# Patient Record
Sex: FEMALE | Race: ASIAN | Employment: FULL TIME | URBAN - METROPOLITAN AREA
[De-identification: names, ages, dates, MRNs, and addresses within clinical notes are randomized per-mention and may not be internally consistent; named-entity substitution may affect disease eponyms.]

---

## 2017-05-30 ENCOUNTER — TRANSFERRED RECORDS (OUTPATIENT)
Dept: HEALTH INFORMATION MANAGEMENT | Facility: CLINIC | Age: 47
End: 2017-05-30

## 2017-05-30 LAB
HPV ABSTRACT: NORMAL
PAP-ABSTRACT: NORMAL

## 2017-11-24 ENCOUNTER — APPOINTMENT (OUTPATIENT)
Dept: GENERAL RADIOLOGY | Facility: CLINIC | Age: 47
End: 2017-11-24
Attending: EMERGENCY MEDICINE
Payer: COMMERCIAL

## 2017-11-24 ENCOUNTER — HOSPITAL ENCOUNTER (EMERGENCY)
Facility: CLINIC | Age: 47
Discharge: HOME | End: 2017-11-24
Attending: EMERGENCY MEDICINE | Admitting: EMERGENCY MEDICINE
Payer: COMMERCIAL

## 2017-11-24 ENCOUNTER — APPOINTMENT (OUTPATIENT)
Dept: CT IMAGING | Facility: CLINIC | Age: 47
End: 2017-11-24
Attending: EMERGENCY MEDICINE
Payer: COMMERCIAL

## 2017-11-24 VITALS
RESPIRATION RATE: 18 BRPM | SYSTOLIC BLOOD PRESSURE: 137 MMHG | OXYGEN SATURATION: 100 % | BODY MASS INDEX: 20.58 KG/M2 | HEIGHT: 61 IN | TEMPERATURE: 97 F | DIASTOLIC BLOOD PRESSURE: 93 MMHG | WEIGHT: 109 LBS

## 2017-11-24 DIAGNOSIS — R05.9 COUGH: Primary | ICD-10-CM

## 2017-11-24 DIAGNOSIS — J42 CHRONIC BRONCHITIS, UNSPECIFIED CHRONIC BRONCHITIS TYPE (H): ICD-10-CM

## 2017-11-24 DIAGNOSIS — R91.8 PULMONARY NODULES: ICD-10-CM

## 2017-11-24 LAB
ANION GAP SERPL CALCULATED.3IONS-SCNC: 5 MMOL/L (ref 3–14)
BASOPHILS # BLD AUTO: 0.1 10E9/L (ref 0–0.2)
BASOPHILS NFR BLD AUTO: 0.5 %
BUN SERPL-MCNC: 16 MG/DL (ref 7–30)
CALCIUM SERPL-MCNC: 8.7 MG/DL (ref 8.5–10.1)
CHLORIDE SERPL-SCNC: 105 MMOL/L (ref 94–109)
CO2 SERPL-SCNC: 28 MMOL/L (ref 20–32)
CREAT SERPL-MCNC: 0.65 MG/DL (ref 0.52–1.04)
D DIMER PPP FEU-MCNC: 0.6 UG/ML FEU (ref 0–0.5)
DIFFERENTIAL METHOD BLD: NORMAL
EOSINOPHIL # BLD AUTO: 0.7 10E9/L (ref 0–0.7)
EOSINOPHIL NFR BLD AUTO: 7.6 %
ERYTHROCYTE [DISTWIDTH] IN BLOOD BY AUTOMATED COUNT: 12.3 % (ref 10–15)
GFR SERPL CREATININE-BSD FRML MDRD: >90 ML/MIN/1.7M2
GLUCOSE SERPL-MCNC: 90 MG/DL (ref 70–99)
HCT VFR BLD AUTO: 41.6 % (ref 35–47)
HGB BLD-MCNC: 13.8 G/DL (ref 11.7–15.7)
IMM GRANULOCYTES # BLD: 0 10E9/L (ref 0–0.4)
IMM GRANULOCYTES NFR BLD: 0.2 %
LYMPHOCYTES # BLD AUTO: 2.1 10E9/L (ref 0.8–5.3)
LYMPHOCYTES NFR BLD AUTO: 22.2 %
MCH RBC QN AUTO: 29 PG (ref 26.5–33)
MCHC RBC AUTO-ENTMCNC: 33.2 G/DL (ref 31.5–36.5)
MCV RBC AUTO: 87 FL (ref 78–100)
MONOCYTES # BLD AUTO: 0.7 10E9/L (ref 0–1.3)
MONOCYTES NFR BLD AUTO: 6.7 %
NEUTROPHILS # BLD AUTO: 6.1 10E9/L (ref 1.6–8.3)
NEUTROPHILS NFR BLD AUTO: 62.8 %
NRBC # BLD AUTO: 0 10*3/UL
NRBC BLD AUTO-RTO: 0 /100
PLATELET # BLD AUTO: 218 10E9/L (ref 150–450)
POTASSIUM SERPL-SCNC: 4.2 MMOL/L (ref 3.4–5.3)
RBC # BLD AUTO: 4.76 10E12/L (ref 3.8–5.2)
SODIUM SERPL-SCNC: 138 MMOL/L (ref 133–144)
TROPONIN I SERPL-MCNC: <0.015 UG/L (ref 0–0.04)
WBC # BLD AUTO: 9.7 10E9/L (ref 4–11)

## 2017-11-24 PROCEDURE — 94640 AIRWAY INHALATION TREATMENT: CPT

## 2017-11-24 PROCEDURE — 85379 FIBRIN DEGRADATION QUANT: CPT | Performed by: EMERGENCY MEDICINE

## 2017-11-24 PROCEDURE — 99285 EMERGENCY DEPT VISIT HI MDM: CPT | Mod: 25

## 2017-11-24 PROCEDURE — 84484 ASSAY OF TROPONIN QUANT: CPT | Performed by: EMERGENCY MEDICINE

## 2017-11-24 PROCEDURE — 85025 COMPLETE CBC W/AUTO DIFF WBC: CPT | Performed by: EMERGENCY MEDICINE

## 2017-11-24 PROCEDURE — 25000128 H RX IP 250 OP 636: Performed by: EMERGENCY MEDICINE

## 2017-11-24 PROCEDURE — 25000125 ZZHC RX 250: Performed by: EMERGENCY MEDICINE

## 2017-11-24 PROCEDURE — 71260 CT THORAX DX C+: CPT

## 2017-11-24 PROCEDURE — 80048 BASIC METABOLIC PNL TOTAL CA: CPT | Performed by: EMERGENCY MEDICINE

## 2017-11-24 PROCEDURE — 71020 XR CHEST 2 VW: CPT

## 2017-11-24 PROCEDURE — 25000131 ZZH RX MED GY IP 250 OP 636 PS 637: Performed by: EMERGENCY MEDICINE

## 2017-11-24 PROCEDURE — 40000275 ZZH STATISTIC RCP TIME EA 10 MIN

## 2017-11-24 RX ORDER — IOPAMIDOL 755 MG/ML
500 INJECTION, SOLUTION INTRAVASCULAR ONCE
Status: COMPLETED | OUTPATIENT
Start: 2017-11-24 | End: 2017-11-24

## 2017-11-24 RX ORDER — BENZONATATE 100 MG/1
100 CAPSULE ORAL
COMMUNITY
Start: 2017-11-08 | End: 2020-10-06

## 2017-11-24 RX ORDER — CODEINE PHOSPHATE/GUAIFENESIN 10-100MG/5
5-10 LIQUID (ML) ORAL
COMMUNITY
Start: 2017-11-08 | End: 2020-10-06

## 2017-11-24 RX ORDER — IPRATROPIUM BROMIDE AND ALBUTEROL SULFATE 2.5; .5 MG/3ML; MG/3ML
3 SOLUTION RESPIRATORY (INHALATION)
Status: DISCONTINUED | OUTPATIENT
Start: 2017-11-24 | End: 2017-11-24 | Stop reason: HOSPADM

## 2017-11-24 RX ORDER — ALBUTEROL SULFATE 90 UG/1
2 AEROSOL, METERED RESPIRATORY (INHALATION) EVERY 6 HOURS PRN
Qty: 1 INHALER | Refills: 0 | Status: SHIPPED | OUTPATIENT
Start: 2017-11-24 | End: 2020-10-06

## 2017-11-24 RX ADMIN — DEXAMETHASONE 10 MG: 2 TABLET ORAL at 02:51

## 2017-11-24 RX ADMIN — IPRATROPIUM BROMIDE AND ALBUTEROL SULFATE 3 ML: .5; 3 SOLUTION RESPIRATORY (INHALATION) at 01:56

## 2017-11-24 RX ADMIN — SODIUM CHLORIDE 71 ML: 9 INJECTION, SOLUTION INTRAVENOUS at 03:33

## 2017-11-24 RX ADMIN — IOPAMIDOL 54 ML: 755 INJECTION, SOLUTION INTRAVENOUS at 03:33

## 2017-11-24 ASSESSMENT — ENCOUNTER SYMPTOMS
FEVER: 0
COUGH: 1
SHORTNESS OF BREATH: 1
WHEEZING: 1

## 2017-11-24 NOTE — DISCHARGE INSTRUCTIONS
Discharge Instructions  Bronchitis, Pneumonia, Bronchospasm    You were seen today for a chest infection or inflammation. If your provider decided this was due to a bacterial infection, you may need an antibiotic. Sometimes these are caused by a virus, and then an antibiotic will not help.     Generally, every Emergency Department visit should have a follow-up clinic visit with either a primary or a specialty clinic/provider. Please follow-up as instructed by your emergency provider today.    Return to the Emergency Department if:    Your breathing gets much worse.    You are very weak, or feel much more ill.    You develop new symptoms, such as chest pain.    You cough up blood.    You are vomiting (throwing up) enough that you cannot keep fluids or your medicine down.    What can I do to help myself?    Fill any prescriptions the provider gave you and take them right away--especially antibiotics. Be sure to finish the whole antibiotic prescription.    You may be given a prescription for an inhaler, which can help loosen tight air passages.  Use this as needed, but not more often than directed. Inhalers work much better when used with a spacer.     You may be given a prescription for a steroid to reduce inflammation. Used long-term, these can have side effects, but for short-term use they are safe. You may notice restlessness or increased appetite.        You may use non-prescription cough or cold medicines. Cough medicines may help, but don t make the cough go away completely.     Avoid smoke, because this can make your symptoms worse. If you smoke, this may be a good time to quit! Consider using nicotine lozenges, gum, or patches to reduce cravings.     If you have a fever, Tylenol  (acetaminophen), Motrin  (ibuprofen), or Advil  (ibuprofen) may help bring fever down and may help you feel more comfortable. Be sure to read and follow the package directions, and ask your provider if you have questions.    Be sure  to get your flu shot each year.  For certain ages, the pneumonia shot can help prevent pneumonia.  If you were given a prescription for medicine here today, be sure to read all of the information (including the package insert) that comes with your prescription.  This will include important information about the medicine, its side effects, and any warnings that you need to know about.  The pharmacist who fills the prescription can provide more information and answer questions you may have about the medicine.  If you have questions or concerns that the pharmacist cannot address, please call or return to the Emergency Department.     Remember that you can always come back to the Emergency Department if you are not able to see your regular provider in the amount of time listed above, if you get any new symptoms, or if there is anything that worries you.

## 2017-11-24 NOTE — ED AVS SNAPSHOT
[facility] Emergency Department    [address] [facility] [address]    Phone:  [phone]    Fax:  [phone]                                       Ju Pimentel   MRN: [id number]    Department:  [facility] Emergency Department   Date of Visit:  11/24/2017           Patient Information     Date Of Birth          1970        Your diagnoses for this visit were:     Chronic bronchitis, unspecified chronic bronchitis type (H)     Cough     Pulmonary nodules        You were seen by Srinath Phoenix MD.      Follow-up Information     Schedule an appointment as soon as possible for a visit with [facility].    Contact information:    [address] [place] [address]  [place] [address]  [phone]          Follow up with [facility] Emergency Department.    Specialty:  EMERGENCY MEDICINE    Why:  If symptoms worsen    Contact information:    [address] [facility] [address] [phone]        Discharge Instructions       Discharge Instructions  Bronchitis, Pneumonia, Bronchospasm    You were seen today for a chest infection or inflammation. If your provider decided this was due to a bacterial infection, you may need an antibiotic. Sometimes these are caused by a virus, and then an antibiotic will not help.     Generally, every Emergency Department visit should have a follow-up clinic visit with either a primary or a specialty clinic/provider. Please follow-up as instructed by your emergency provider today.    Return to the Emergency Department if:    Your breathing gets much worse.    You are very weak, or feel much more ill.    You develop new symptoms, such as chest pain.    You cough up blood.    You are vomiting (throwing up) enough that you cannot keep fluids or your medicine down.    What can I do to help myself?    Fill any prescriptions the provider gave you and take them right away--especially antibiotics. Be sure to finish the whole  antibiotic prescription.    You may be given a prescription for an inhaler, which can help loosen tight air passages.  Use this as needed, but not more often than directed. Inhalers work much better when used with a spacer.     You may be given a prescription for a steroid to reduce inflammation. Used long-term, these can have side effects, but for short-term use they are safe. You may notice restlessness or increased appetite.        You may use non-prescription cough or cold medicines. Cough medicines may help, but don t make the cough go away completely.     Avoid smoke, because this can make your symptoms worse. If you smoke, this may be a good time to quit! Consider using nicotine lozenges, gum, or patches to reduce cravings.     If you have a fever, Tylenol  (acetaminophen), Motrin  (ibuprofen), or Advil  (ibuprofen) may help bring fever down and may help you feel more comfortable. Be sure to read and follow the package directions, and ask your provider if you have questions.    Be sure to get your flu shot each year.  For certain ages, the pneumonia shot can help prevent pneumonia.  If you were given a prescription for medicine here today, be sure to read all of the information (including the package insert) that comes with your prescription.  This will include important information about the medicine, its side effects, and any warnings that you need to know about.  The pharmacist who fills the prescription can provide more information and answer questions you may have about the medicine.  If you have questions or concerns that the pharmacist cannot address, please call or return to the Emergency Department.     Remember that you can always come back to the Emergency Department if you are not able to see your regular provider in the amount of time listed above, if you get any new symptoms, or if there is anything that worries you.      24 Hour Appointment Hotline       To make an appointment at any [place]  clinic, call [phone] ([phone]). If you don't have a family doctor or clinic, we will help you find one. [facility] are conveniently located to serve the needs of you and your family.          ED Discharge Orders     Lung Nodule Program Referral Location: [facility] - [phone]       Please be aware that coverage of these services is subject to the terms and limitations of your health insurance plan. Call member services at your health plan with any benefit or coverage questions.     For [facility] and [place] locations, you will be contacted within 1-2 business days to schedule your appointment, if you haven't heard from us please call the number above. For Other referrals, please call the location directly.                          Review of your medicines      START taking        Dose / Directions Last dose taken    albuterol 108 (90 BASE) MCG/ACT Inhaler   Commonly known as:  PROAIR HFA/PROVENTIL HFA/VENTOLIN HFA   Dose:  2 puff   Quantity:  1 Inhaler        Inhale 2 puffs into the lungs every 6 hours as needed for shortness of breath / dyspnea or wheezing   Refills:  0          Our records show that you are taking the medicines listed below. If these are incorrect, please call your family doctor or clinic.        Dose / Directions Last dose taken    benzonatate 100 MG capsule   Commonly known as:  TESSALON   Dose:  100 mg        Take 100 mg by mouth   Refills:  0        guaiFENesin-codeine 100-10 MG/5ML Syrp syrup   Commonly known as:  guaiFENesin AC   Dose:  5-10 mL        Take 5-10 mLs by mouth   Refills:  0                Prescriptions were sent or printed at these locations (1 Prescription)                   Other Prescriptions                Printed at Department/Unit printer (1 of 1)         albuterol (PROAIR HFA/PROVENTIL HFA/VENTOLIN HFA) 108 (90 BASE) MCG/ACT Inhaler                Procedures and tests performed during your visit     Basic  metabolic panel    CBC with platelets differential    CT Chest Pulmonary Embolism w Contrast    D dimer quantitative    EKG 12-lead, tracing only    Respiratory therapy to instruct    Troponin I    XR Chest 2 Views      Orders Needing Specimen Collection     None      Pending Results     Date and Time Order Name Status Description    11/24/2017 0146 EKG 12-lead, tracing only Preliminary             Pending Culture Results     No orders found from 11/22/2017 to 11/25/2017.            Pending Results Instructions     If you had any lab results that were not finalized at the time of your Discharge, you can call the ED Lab Result RN at [phone]. You will be contacted by this team for any positive Lab results or changes in treatment. The nurses are available 7 days a week from 10A to 6:30P.  You can leave a message 24 hours per day and they will return your call.        Test Results From Your Hospital Stay        11/24/2017  2:01 AM      Component Results     Component Value Ref Range & Units Status    WBC 9.7 4.0 - 11.0 10e9/L Final    RBC Count 4.76 3.8 - 5.2 10e12/L Final    Hemoglobin 13.8 11.7 - 15.7 g/dL Final    Hematocrit 41.6 35.0 - 47.0 % Final    MCV 87 78 - 100 fl Final    MCH 29.0 26.5 - 33.0 pg Final    MCHC 33.2 31.5 - 36.5 g/dL Final    RDW 12.3 10.0 - 15.0 % Final    Platelet Count 218 150 - 450 10e9/L Final    Diff Method Automated Method  Final    % Neutrophils 62.8 % Final    % Lymphocytes 22.2 % Final    % Monocytes 6.7 % Final    % Eosinophils 7.6 % Final    % Basophils 0.5 % Final    % Immature Granulocytes 0.2 % Final    Nucleated RBCs 0 0 /100 Final    Absolute Neutrophil 6.1 1.6 - 8.3 10e9/L Final    Absolute Lymphocytes 2.1 0.8 - 5.3 10e9/L Final    Absolute Monocytes 0.7 0.0 - 1.3 10e9/L Final    Absolute Eosinophils 0.7 0.0 - 0.7 10e9/L Final    Absolute Basophils 0.1 0.0 - 0.2 10e9/L Final    Abs Immature Granulocytes 0.0 0 - 0.4 10e9/L Final    Absolute Nucleated RBC 0.0  Final          11/24/2017  2:25 AM      Component Results     Component Value Ref Range & Units Status    Sodium 138 133 - 144 mmol/L Final    Potassium 4.2 3.4 - 5.3 mmol/L Final    Specimen slightly hemolyzed, potassium may be falsely elevated    Chloride 105 94 - 109 mmol/L Final    Carbon Dioxide 28 20 - 32 mmol/L Final    Anion Gap 5 3 - 14 mmol/L Final    Glucose 90 70 - 99 mg/dL Final    Urea Nitrogen 16 7 - 30 mg/dL Final    Creatinine 0.65 0.52 - 1.04 mg/dL Final    GFR Estimate >90 >60 mL/min/1.7m2 Final    GFR Estimate If Black >90 >60 mL/min/1.7m2 Final    Calcium 8.7 8.5 - 10.1 mg/dL Final               11/24/2017  2:25 AM      Component Results     Component Value Ref Range & Units Status    Troponin I ES <0.015 0.000 - 0.045 ug/L Final         11/24/2017  2:19 AM      Narrative     XR CHEST 2 VW   11/24/2017 2:14 AM     INDICATION: Shortness of breath.    COMPARISON: None.        Impression     IMPRESSION: No infiltrates or other acute findings. Heart size is  within normal limits. Faint 0.7 cm nodule projecting over the left  lung base, only seen on the PA view. This is technically indeterminate  but may be a prominent nipple shadow. Slight pectus excavatum.    JESSICA MICHEL MD         11/24/2017  2:54 AM      Component Results     Component Value Ref Range & Units Status    D Dimer 0.6 (H) 0.0 - 0.50 ug/ml FEU Final    This D-dimer assay is intended for use in conjunction with a clinical pretest   probability assessment model to exclude pulmonary embolism (PE) and deep   venous thrombosis (DVT) in outpatients suspected of PE or DVT. The cut-off   value is 0.5 ug/mL FEU.           11/24/2017  3:58 AM      Narrative     CT CHEST PULMONARY EMBOLISM W CONTRAST  11/24/2017 3:36 AM     HISTORY: Shortness of breath, elevated D-dimer.     TECHNIQUE: Volumetric acquisition of the chest after the  administration of 54 mL Isovue-370 IV contrast. Radiation dose for  this scan was reduced using automated exposure  control, adjustment of  the mA and/or kV according to patient size, or iterative  reconstruction technique.     COMPARISON: None.    FINDINGS: No visualized pulmonary embolism. Normal thoracic aorta.  Minimal dependent air-space opacity at the lung bases, likely  atelectasis. No air-space consolidation or pleural effusion. There is  a 0.5 cm nodule in the right midlung and a 0.6 cm nodule in the left  lower lobe. These are technically indeterminant, but quite dense and  almost certainly benign granulomas. If desired, follow-up CT could be  performed in one year to document stability. No enlarged mediastinal  or hilar lymph nodes. Mild pectus deformity.        Impression     IMPRESSION: No visualized pulmonary embolism or other acute findings.    JESSICA MICHEL MD                Clinical Quality Measure: Blood Pressure Screening     Your blood pressure was checked while you were in the emergency department today. The last reading we obtained was  BP: (!) 137/93 . Please read the guidelines below about what these numbers mean and what you should do about them.  If your systolic blood pressure (the top number) is less than 120 and your diastolic blood pressure (the bottom number) is less than 80, then your blood pressure is normal. There is nothing more that you need to do about it.  If your systolic blood pressure (the top number) is 120-139 or your diastolic blood pressure (the bottom number) is 80-89, your blood pressure may be higher than it should be. You should have your blood pressure rechecked within a year by a primary care provider.  If your systolic blood pressure (the top number) is 140 or greater or your diastolic blood pressure (the bottom number) is 90 or greater, you may have high blood pressure. High blood pressure is treatable, but if left untreated over time it can put you at risk for heart attack, stroke, or kidney failure. You should have your blood pressure rechecked by a primary care provider  "within the next 4 weeks.  If your provider in the emergency department today gave you specific instructions to follow-up with your doctor or provider even sooner than that, you should follow that instruction and not wait for up to 4 weeks for your follow-up visit.        Thank you for choosing [facility]       Thank you for choosing [facility] for your care. Our goal is always to provide you with excellent care. Hearing back from our patients is one way we can continue to improve our services. Please take a few minutes to complete the written survey that you may receive in the mail after you visit with us. Thank you!        [employer] Information     Syn[employer] lets you send messages to your doctor, view your test results, renew your prescriptions, schedule appointments and more. To sign up, go to [email or web address] . Click on \"Log in\" on the left side of the screen, which will take you to the Welcome page. Then click on \"Sign up Now\" on the right side of the page.     You will be asked to enter the access code listed below, as well as some personal information. Please follow the directions to create your username and password.     Your access code is: [email or web address]  Expires: 2018  4:05 AM     Your access code will  in 90 days. If you need help or a new code, please call your [facility] clinic or [phone].        Care EveryWhere ID     This is your Care EveryWhere ID. This could be used by other organizations to access your [facility] medical records  [id number]        Equal Access to Services     KERLINE HAM : Hadii lai ramoso Soaparna, waaxda luveena, qaybta kaalmada cathy gonzalez . So [facility] [phone].    ATENCIÓN: Si habla español, tiene a uriarte disposición servicios gratuitos de asistencia lingüística. Llame al [phone].    We comply with applicable federal civil rights laws and [place] laws. We do not discriminate on the basis of race, color, national " origin, age, disability, sex, sexual orientation, or gender identity.            After Visit Summary       This is your record. Keep this with you and show to your community pharmacist(s) and doctor(s) at your next visit.

## 2017-11-24 NOTE — ED PROVIDER NOTES
History     Chief Complaint:  Cough and Shortness of Breath     History provided by: daughter's significant other. The history is limited by a language barrier.      Ju Pimentel is a 47 year old female who presents to the emergency department for evaluation of cough and shortness of breath. The patient's family member reports that she has been coughing consistently for the past 3 to 4 weeks. The patient was seen on 11/21/2017 at an [facility] clinic for evaluation of her cough and received a chest x-ray, see negative result below. The family member reports that the coughing became more severe on the evening of 11/23/2017 and caused her difficulty breathing. The patient denies sore throat and has no history of asthma.     Cardiac/PE/DVT Risk Factors  History of hypertension: Negative  History of hyperlipidemia: Negative  History of diabetes: Negative  History of smoking: Negative  Personal history of PE/DVT: Negative  Family history of PE/DVT: Negative  Family history of heart complications: Negative  Recent travel: Negative  Recent surgery: Negative  Other immobilizations: Negative  Cancer: Negative  Hormone therapy: Negative    XR Chest 2 Views PA and Lateral (11/21/2017 11:45 AM)  Negative chest.  Reading per radiology.    Allergies:  Allergies reviewed. No pertinent allergies.     Medications:    Guaifenesin AC  Tessalon    Past Medical History:    History reviewed. No pertinent past medical history.    Past Surgical History:    History reviewed. No pertinent surgical history.    Family History:    Family history reviewed. No pertinent family history.    Social History:  The patient was accompanied to the emergency department by her daughter's significant other.  Smoking Status: Never Smoker  Tobacco Use: Unknown  Alcohol Use: No  Marital Status: Single     Review of Systems   Constitutional: Negative for fever.   Respiratory: Positive for cough, shortness of breath and wheezing.    All other systems reviewed  "and are negative.    Physical Exam     Patient Vitals for the past 24 hrs:   BP Temp Heart Rate Resp SpO2 Height Weight   11/24/17 0156 - - 80 18 100 % - -   11/24/17 0114 - - - - - 1.549 m (5' 1\") 49.4 kg (109 lb)   11/24/17 0113 (!) 137/93 97  F (36.1  C) 100 18 97 % - -       Physical Exam  General: Alert, appears well-developed and well-nourished. Cooperative.     In mild distress. Significant cough at bedside.  HEENT:  Head:  Atraumatic  Ears:  External ears are normal  Mouth/Throat:  Oropharynx is without erythema or exudate and mucous membranes are dry.   Eyes:   Conjunctivae normal and EOM are normal. No scleral icterus.    Pupils are equal, round, and reactive to light.   CV:  Tachycardic rate, regular rhythm, normal heart sounds and radial and dorsalis pedis pulses are 2+ and symmetric.  No murmur.  Resp:  Breath sounds are coarse bilaterally with right lower expiratory wheeze and rale.    Non-labored, no retractions or accessory muscle use.  + Cough, non-productive.  GI:  Abdomen is soft, no distension, no tenderness. No rebound or guarding.  MS:  Normal range of motion. No edema.    Normal strength in all 4 extremities.     Back atraumatic.  Skin:  Warm and dry.  No rash or lesions noted.  Neuro: Alert. Normal strength.  Sensation intact in all 4 extremities. GCS: 15  Psych:  Normal mood and affect.  Lymph: No anterior or posterior cervical lymphadenopathy noted.    Emergency Department Course     ECG:  ECG taken at 0154, ECG read at 0203  Normal sinus rhythm  Low voltage QRS  Borderline ECG  Rate 82 bpm. IA interval 146 ms. QRS duration 70 ms. QT/QTc 378/441 ms. P-R-T axes 61 63 50.    Imaging:  Radiology findings were communicated with the patient who voiced understanding of the findings.    CT Chest Pulmonary Embolism w Contrast  No visualized pulmonary embolism or other acute findings.  Reading per radiology.    XR Chest 2 Views  No infiltrates or other acute findings. Heart size is  within normal " limits. Faint 0.7 cm nodule projecting over the left  lung base, only seen on the PA view. This is technically indeterminate  but may be a prominent nipple shadow. Slight pectus excavatum.  Reading per radiology.    Laboratory:  Laboratory findings were communicated with the patient who voiced understanding of the findings.    D dimer (Collected 0225): 0.6 (H)  CBC: WBC 9.7, HGB 13.8,   BMP: WNL (Creatinine 0.65)  Troponin (Collected 0153): <0.015     Interventions:  0156 Duoneb 3 mL Neb  0251 Decadron 10 mg PO  0333 NS Bolus 1000 mL IV    Emergency Department Course:    Nursing notes and vitals reviewed.    I performed an exam of the patient as documented above.     IV was inserted and blood was drawn for laboratory testing, results above.     The patient was sent for a chest CT and chest x-ray while in the emergency department, results above.    0255 I reassessed and updated the patient.    I personally reviewed the laboratory and imaging results with the patient and answered all related questions prior to discharge.    Impression & Plan      Medical Decision Making:  Ju Pimentel is a 47 year old female with no pertinent past medical history who presents with persistent cough over the last two months. Patient was evaluated approximately three days ago with a negative chest x-ray. She presents today because she has worsening shortness of breath as well as some intermittent chest discomfort over the last two months. Patient notes that her cough is very persistent, but she does not have a fever. Low concern for acute infections. I do have concerns for possible spontaneous pneumothorax. Initial chest x-ray was negative for any evidence of pneumothorax. No hx of trauma, and low concern for traumatic injuries as a cause for her pleuritic chest discomfort. Due to the acute change in her symptoms I did obtain a D dimer, as I could not fully exclude a possible pulmonary embolism since she was mildly tachycardic on  arrival. D dimer was elevated. We did send for a CT pulmonary embolism evaluation which showed no evidence of pulmonary embolism. There were incidental findings of nodules in the right and left lungs. Order placed for lung nodule follow up at earliest convenience.     Patient did improve with Duoneb. She did have some expiratory wheezing in the right base initially. Felt much improved subjectively on my reassessment. I did provide a dose of Decadron due to persistent cough symptoms and wheezing on her initial exam for probable reactive airway disease in setting of chronic bronchitis. She will be discharged with an albuterol inhaler as part of her therapy regimen for this chronic cough. She should follow up closely with her primary care provider and consider further evaluation if this cough is persistent. There is certainly a possibility allergens could be contributing to this chronic cough. She should take this into consideration with her primary care physician and discuss further. Understood close return precautions for development of fever, worsening symptoms, and difficulty breathing. All questions answered prior to discharge. Discharged home.    Diagnosis:    ICD-10-CM    1. Cough R05 D dimer quantitative   2. Chronic bronchitis, unspecified chronic bronchitis type (H) J42    3. Pulmonary nodules R91.8 Lung Nodule Program Referral Location: [facility] - [phone]     Disposition:   The patient was discharged home.    Discharge Medications:  Albuterol inhaler    Scribe Disclosure:  Ashley MACIEL, am serving as a scribe at 1:44 AM on 11/24/2017 to document services personally performed by Srinath Phoenix MD, based on my observations and the provider's statements to me.        [facility] EMERGENCY DEPARTMENT       Srinath Phoenix MD  11/24/17 0655

## 2017-11-24 NOTE — ED AVS SNAPSHOT
[facility] Emergency Department    [address]    [facility] [address]    Phone:  [phone]    Fax:  [phone]                                       Ju Pimentel   MRN: [id number]    Department:  [facility] Emergency Department   Date of Visit:  11/24/2017           After Visit Summary Signature Page     I have received my discharge instructions, and my questions have been answered. I have discussed any challenges I see with this plan with the nurse or doctor.    ..........................................................................................................................................  Patient/Patient Representative Signature      ..........................................................................................................................................  Patient Representative Print Name and Relationship to Patient    ..................................................               ................................................  Date                                            Time    ..........................................................................................................................................  Reviewed by Signature/Title    ...................................................              ..............................................  Date                                                            Time

## 2017-11-26 LAB — INTERPRETATION ECG - MUSE: NORMAL

## 2018-02-17 ENCOUNTER — TRANSFERRED RECORDS (OUTPATIENT)
Dept: HEALTH INFORMATION MANAGEMENT | Facility: CLINIC | Age: 48
End: 2018-02-17

## 2020-03-19 ENCOUNTER — APPOINTMENT (OUTPATIENT)
Dept: INTERPRETER SERVICES | Facility: CLINIC | Age: 50
End: 2020-03-19
Payer: COMMERCIAL

## 2020-03-20 ENCOUNTER — TRANSFERRED RECORDS (OUTPATIENT)
Dept: HEALTH INFORMATION MANAGEMENT | Facility: CLINIC | Age: 50
End: 2020-03-20

## 2020-10-06 ENCOUNTER — ANCILLARY PROCEDURE (OUTPATIENT)
Dept: MAMMOGRAPHY | Facility: CLINIC | Age: 50
End: 2020-10-06
Attending: NURSE PRACTITIONER
Payer: COMMERCIAL

## 2020-10-06 ENCOUNTER — OFFICE VISIT (OUTPATIENT)
Dept: PEDIATRICS | Facility: CLINIC | Age: 50
End: 2020-10-06
Payer: COMMERCIAL

## 2020-10-06 VITALS
DIASTOLIC BLOOD PRESSURE: 74 MMHG | RESPIRATION RATE: 16 BRPM | HEART RATE: 75 BPM | WEIGHT: 117.3 LBS | BODY MASS INDEX: 23.65 KG/M2 | OXYGEN SATURATION: 100 % | HEIGHT: 59 IN | TEMPERATURE: 98.1 F | SYSTOLIC BLOOD PRESSURE: 104 MMHG

## 2020-10-06 DIAGNOSIS — L71.9 ROSACEA: ICD-10-CM

## 2020-10-06 DIAGNOSIS — R06.02 SHORTNESS OF BREATH: ICD-10-CM

## 2020-10-06 DIAGNOSIS — Z12.31 ENCOUNTER FOR SCREENING MAMMOGRAM FOR BREAST CANCER: ICD-10-CM

## 2020-10-06 DIAGNOSIS — Z00.00 ROUTINE GENERAL MEDICAL EXAMINATION AT A HEALTH CARE FACILITY: Primary | ICD-10-CM

## 2020-10-06 DIAGNOSIS — M25.512 ACUTE PAIN OF LEFT SHOULDER: ICD-10-CM

## 2020-10-06 DIAGNOSIS — B18.1 VIRAL HEPATITIS B CHRONIC (H): ICD-10-CM

## 2020-10-06 DIAGNOSIS — H61.22 IMPACTED CERUMEN OF LEFT EAR: ICD-10-CM

## 2020-10-06 DIAGNOSIS — Z12.31 VISIT FOR SCREENING MAMMOGRAM: ICD-10-CM

## 2020-10-06 PROCEDURE — 80053 COMPREHEN METABOLIC PANEL: CPT | Performed by: NURSE PRACTITIONER

## 2020-10-06 PROCEDURE — 80061 LIPID PANEL: CPT | Performed by: NURSE PRACTITIONER

## 2020-10-06 PROCEDURE — 90682 RIV4 VACC RECOMBINANT DNA IM: CPT | Performed by: NURSE PRACTITIONER

## 2020-10-06 PROCEDURE — 99386 PREV VISIT NEW AGE 40-64: CPT | Mod: 25 | Performed by: NURSE PRACTITIONER

## 2020-10-06 PROCEDURE — 77067 SCR MAMMO BI INCL CAD: CPT | Performed by: RADIOLOGY

## 2020-10-06 PROCEDURE — 90471 IMMUNIZATION ADMIN: CPT | Performed by: NURSE PRACTITIONER

## 2020-10-06 PROCEDURE — 99213 OFFICE O/P EST LOW 20 MIN: CPT | Mod: 25 | Performed by: NURSE PRACTITIONER

## 2020-10-06 RX ORDER — METRONIDAZOLE 7.5 MG/G
GEL TOPICAL 2 TIMES DAILY
Qty: 45 G | Refills: 3 | Status: SHIPPED | OUTPATIENT
Start: 2020-10-06

## 2020-10-06 RX ORDER — ALBUTEROL SULFATE 90 UG/1
2 AEROSOL, METERED RESPIRATORY (INHALATION) EVERY 6 HOURS PRN
Qty: 1 INHALER | Refills: 3 | Status: SHIPPED | OUTPATIENT
Start: 2020-10-06

## 2020-10-06 ASSESSMENT — MIFFLIN-ST. JEOR: SCORE: 1056.66

## 2020-10-06 NOTE — LETTER
October 7, 2020      Ju Pimentel  [address]  [place] [address]                Dear Ju,     The results of your recent labs are enclosed.   Your cholesterol is elevated, but not high enough that you need to be treated with a medication. Reduce your consumption of cholesterol and saturated fats and eliminate trans fats from your diet. Increase your consumption of healthy fats (nuts, fish, seafood, avocado, olive oil), whole grains and fruits and vegetables. I also recommend exercising 150 minutes a week. These changes will help to improve your cholesterol.   Liver, kidney function are normal.   Please call the clinic if you have any concerns.     Sincerely,     ANDRESSA Durham CNP     Your [facility] Care Team

## 2020-10-06 NOTE — PROGRESS NOTES
debrox   SUBJECTIVE:   CC: Ju Pimentel is an 50 year old woman who presents for preventive health visit.       Patient has been advised of split billing requirements and indicates understanding: Yes  Healthy Habits:    Getting at least 3 servings of Calcium per day:  Yes    Bi-annual eye exam:  Yes    Dental care twice a year:  Yes    Sleep apnea or symptoms of sleep apnea:  None    Diet:  Regular (no restrictions)    Frequency of exercise:  2-3 days/week    Duration of exercise:  Greater than 60 minutes    Taking medications regularly:  Not Applicable    Barriers to taking medications:  Not applicable    Medication side effects:  Not applicable    PHQ-2 Total Score:    Additional concerns today:  Yes    Transfer[facility] from [facility]    Sometimes experiences SOB and wheezing with exercise  None at rest  Never been tested for asthma  States mom told her she had asthma when she was a kid  Previously had albuterol which was helpful. Ran out  Denies chest pain or fatigue or palpitations  Nonsmoker    Hx chronic hep B   Dx in [place] in 2004  Seen at [facility] 2014 and lost to follow up  Requests referral    Hx right breast biopsy 2018  Fibroadenoma      Musculoskeletal problem/pain      Duration: 1 month    Description  Location: left elbow     Intensity:  mild    Accompanying signs and symptoms: radiation of pain to the shoulder    History  Previous similar problem: no   Previous evaluation:  none    Precipitating or alleviating factors:  Trauma or overuse: no   Aggravating factors include: worse in the morning, she then stretches it then it feels better     Therapies tried and outcome: nothing    Has not tried any medications for it  Worse with activity  No history of injury  Denies weakness or paresthesias    She still gets a regular monthly period    Today's PHQ-2 Score:   PHQ-2 ( 1999 Pfizer) 10/6/2020   Q1: Little interest or pleasure in doing things 0   Q2: Feeling down, depressed or hopeless 0   PHQ-2 Score 0        Abuse: Current or Past (Physical, Sexual or Emotional) - No  Do you feel safe in your environment? Yes        Social History     Tobacco Use     Smoking status: Never Smoker   Substance Use Topics     Alcohol use: No     If you drink alcohol do you typically have >3 drinks per day or >7 drinks per week? No    Alcohol Use 10/6/2020   Prescreen: >3 drinks/day or >7 drinks/week? No       Reviewed orders with patient.  Reviewed health maintenance and updated orders accordingly - Yes  Lab work is in process  Labs reviewed in EPIC  BP Readings from Last 3 Encounters:   10/06/20 104/74   11/24/17 (!) 137/93    Wt Readings from Last 3 Encounters:   10/06/20 53.2 kg (117 lb 4.8 oz)   11/24/17 49.4 kg (109 lb)                  There is no problem list on file for this patient.    No past surgical history on file.    Social History     Tobacco Use     Smoking status: Never Smoker     Smokeless tobacco: Never Used   Substance Use Topics     Alcohol use: No     No family history on file.      Current Outpatient Medications   Medication Sig Dispense Refill     albuterol (PROAIR HFA/PROVENTIL HFA/VENTOLIN HFA) 108 (90 Base) MCG/ACT inhaler Inhale 2 puffs into the lungs every 6 hours as needed for shortness of breath / dyspnea or wheezing 1 Inhaler 3     metroNIDAZOLE (METROGEL) 0.75 % external gel Apply topically 2 times daily Apply to face 45 g 3       Mammogram Screening: Patient over age 50, mutual decision to screen reflected in health maintenance.    Pertinent mammograms are reviewed under the imaging tab.  History of abnormal Pap smear: NO - age 30-65 PAP every 5 years with negative HPV co-testing recommended     Reviewed and updated as needed this visit by clinical staff                 Reviewed and updated as needed this visit by Provider                No past medical history on file.     Review of Systems  CONSTITUTIONAL: NEGATIVE for fever, chills, change in weight  INTEGUMENTARU/SKIN: NEGATIVE for worrisome  "rashes, moles or lesions  EYES: NEGATIVE for vision changes or irritation  ENT: NEGATIVE for ear, mouth and throat problems  RESP: NEGATIVE for significant cough or SOB  BREAST: NEGATIVE for masses, tenderness or discharge  CV: NEGATIVE for chest pain, palpitations or peripheral edema  GI: NEGATIVE for nausea, abdominal pain, heartburn, or change in bowel habits  : NEGATIVE for unusual urinary or vaginal symptoms. Periods are regular.  MUSCULOSKELETAL: NEGATIVE for significant arthralgias or myalgia  NEURO: NEGATIVE for weakness, dizziness or paresthesias  PSYCHIATRIC: NEGATIVE for changes in mood or affect  Except as above     OBJECTIVE:   /74 (BP Location: Right arm, Patient Position: Chair, Cuff Size: Adult Regular)   Pulse 75   Temp 98.1  F (36.7  C) (Tympanic)   Resp 16   Ht 1.505 m (4' 11.25\")   Wt 53.2 kg (117 lb 4.8 oz)   SpO2 100%   BMI 23.49 kg/m    Physical Exam  GENERAL: healthy, alert and no distress  EYES: Eyes grossly normal to inspection, PERRL and conjunctivae and sclerae normal  HENT: right ear canal and TM normal, left ear impacted with cerumen. Attempted to use curette to loosen from ear canal and unable. Cerumen hard.  nose and mouth without ulcers or lesions  NECK: no adenopathy, no asymmetry, masses, or scars and thyroid normal to palpation  RESP: lungs clear to auscultation - no rales, rhonchi or wheezes  BREAST: approximately 1 cm soft, mobile nontender mass right breast at 6 o'clock (this is previously known mass, per patient) no tenderness or nipple discharge and no palpable axillary masses or adenopathy  CV: regular rate and rhythm, normal S1 S2, no S3 or S4, no murmur, click or rub, no peripheral edema and peripheral pulses strong  ABDOMEN: soft, nontender, no hepatosplenomegaly, no masses and bowel sounds normal  MS: no gross musculoskeletal defects noted, no edema, Tenderness left lateral shoulder without masses. ROM intact. Strength 5/5  SKIN: Telangectasia, erythema " bilateral cheeks and nasal folds  NEURO: Normal strength and tone, mentation intact and speech normal  PSYCH: mentation appears normal, affect normal/bright    Diagnostic Test Results:  Labs reviewed in Epic    ASSESSMENT/PLAN:   1. Routine general medical examination at a health care facility  - C RIV4 (FLUBLOK) VACCINE RECOMBINANT DNA PRSRV ANTIBIO FREE, IM [[id number]]  - Comprehensive metabolic panel (BMP + Alb, Alk Phos, ALT, AST, Total. Bili, TP)  - Lipid Profile (Chol, Trig, HDL, LDL calc)    2. Viral hepatitis B chronic (H)  - I don't have her previous records. She reports she was following with [facility] every 6 months and then stopped.   - GASTROENTEROLOGY ADULT REF CONSULT ONLY; Future    3. Shortness of breath  - VSS. Symptoms consistent with asthma. Wheezing and SOB with exercise and resolves with use of DANIELE. Unable to perform spirometry in clinic d/t Covid. Will refill for now and obtain spirometry in future when able. No concerning cardiac symptoms with exertion. Reviewed indications for being seen in clinic versus ED if symptoms worsen/change in future  - albuterol (PROAIR HFA/PROVENTIL HFA/VENTOLIN HFA) 108 (90 Base) MCG/ACT inhaler; Inhale 2 puffs into the lungs every 6 hours as needed for shortness of breath / dyspnea or wheezing  Dispense: 1 Inhaler; Refill: 3    4. Encounter for screening mammogram for breast cancer  - MA Screen Bilateral w/Joey; Future    5. Rosacea  - Requests refill  - metroNIDAZOLE (METROGEL) 0.75 % external gel; Apply topically 2 times daily Apply to face  Dispense: 45 g; Refill: 3    6. Impacted cerumen of left ear  - Too hard and impacted to remove in clinic. Recommend debrox. Can schedule ancillary visit if needed for lavage after loosened  - carbamide peroxide (DEBROX) 6.5 % otic solution; Place 5 drops Into the left ear 2 times daily  Dispense: 15 mL; Refill: 0    7. Acute pain of left shoulder  - No history of injury or red flags to warrant imaging. Recommend physical therapy  "and she declines referral stating she doesn't have time. Recommend rest, ice and not lifting greater than 10 pounds    Patient has been advised of split billing requirements and indicates understanding: Yes  COUNSELING:  Reviewed preventive health counseling, as reflected in patient instructions       Regular exercise       Healthy diet/nutrition       Immunizations    Vaccinated for: Influenza and Declined: Zoster due to Cost            Estimated body mass index is 20.6 kg/m  as calculated from the following:    Height as of 11/24/17: 1.549 m (5' 1\").    Weight as of 11/24/17: 49.4 kg (109 lb).        She reports that she has never smoked. She does not have any smokeless tobacco history on file.      Counseling Resources:  ATP IV Guidelines  Pooled Cohorts Equation Calculator  Breast Cancer Risk Calculator  BRCA-Related Cancer Risk Assessment: FHS-7 Tool  FRAX Risk Assessment  ICSI Preventive Guidelines  Dietary Guidelines for Americans, 2010  USDA's MyPlate  ASA Prophylaxis  Lung CA Screening    ANDRESSA Durham [facility] [place]  "

## 2020-10-07 LAB
ALBUMIN SERPL-MCNC: 3.8 G/DL (ref 3.4–5)
ALP SERPL-CCNC: 58 U/L (ref 40–150)
ALT SERPL W P-5'-P-CCNC: 41 U/L (ref 0–50)
ANION GAP SERPL CALCULATED.3IONS-SCNC: 9 MMOL/L (ref 3–14)
AST SERPL W P-5'-P-CCNC: 32 U/L (ref 0–45)
BILIRUB SERPL-MCNC: 0.6 MG/DL (ref 0.2–1.3)
BUN SERPL-MCNC: 16 MG/DL (ref 7–30)
CALCIUM SERPL-MCNC: 9.1 MG/DL (ref 8.5–10.1)
CHLORIDE SERPL-SCNC: 107 MMOL/L (ref 94–109)
CHOLEST SERPL-MCNC: 210 MG/DL
CO2 SERPL-SCNC: 21 MMOL/L (ref 20–32)
CREAT SERPL-MCNC: 0.7 MG/DL (ref 0.52–1.04)
GFR SERPL CREATININE-BSD FRML MDRD: >90 ML/MIN/{1.73_M2}
GLUCOSE SERPL-MCNC: 88 MG/DL (ref 70–99)
HDLC SERPL-MCNC: 72 MG/DL
LDLC SERPL CALC-MCNC: 125 MG/DL
NONHDLC SERPL-MCNC: 138 MG/DL
POTASSIUM SERPL-SCNC: 4.3 MMOL/L (ref 3.4–5.3)
PROT SERPL-MCNC: 8.2 G/DL (ref 6.8–8.8)
SODIUM SERPL-SCNC: 137 MMOL/L (ref 133–144)
TRIGL SERPL-MCNC: 66 MG/DL

## 2020-10-07 NOTE — RESULT ENCOUNTER NOTE
[facility] [place]  [address]  [address]  [place] [address]  Phone: [phone]  Fax: [phone]      10/07/20    Ju Pimentel  [address] [place] [address]      Dear Ju,    The results of your recent labs are enclosed.  Your cholesterol is elevated, but not high enough that you need to be treated with a medication. Reduce your consumption of cholesterol and saturated fats and eliminate trans fats from your diet. Increase your consumption of healthy fats (nuts, fish, seafood, avocado, olive oil), whole grains and fruits and vegetables. I also recommend exercising 150 minutes a week. These changes will help to improve your cholesterol.   Liver, kidney function are normal.   Please call the clinic if you have any concerns.    Sincerely,    ANDRESSA Durham CNP    Your [facility] Care Team

## 2021-07-14 NOTE — ED NOTES
Patient presents to ED due to cough. Patient states she has been having cough for the past 2 months. Followed up with PCP on 11/21/17, had chest xray was negative . Prescribed tessalon and robitussin with minimal relief. States cough is making it difficult to breath     Pt is excused tonight as planned had an event that was scheduled prior to admission. Renetta Hutchins LCPC,CAADC